# Patient Record
Sex: MALE | Race: WHITE | Employment: FULL TIME | URBAN - NONMETROPOLITAN AREA
[De-identification: names, ages, dates, MRNs, and addresses within clinical notes are randomized per-mention and may not be internally consistent; named-entity substitution may affect disease eponyms.]

---

## 2017-01-18 ENCOUNTER — OFFICE VISIT (OUTPATIENT)
Dept: PRIMARY CARE CLINIC | Age: 27
End: 2017-01-18
Payer: COMMERCIAL

## 2017-01-18 VITALS
HEART RATE: 120 BPM | WEIGHT: 220.5 LBS | HEIGHT: 68 IN | SYSTOLIC BLOOD PRESSURE: 130 MMHG | OXYGEN SATURATION: 96 % | DIASTOLIC BLOOD PRESSURE: 78 MMHG | BODY MASS INDEX: 33.42 KG/M2 | TEMPERATURE: 99 F

## 2017-01-18 DIAGNOSIS — J01.10 ACUTE FRONTAL SINUSITIS, RECURRENCE NOT SPECIFIED: Primary | ICD-10-CM

## 2017-01-18 DIAGNOSIS — M67.432 GANGLION CYST OF WRIST, LEFT: ICD-10-CM

## 2017-01-18 PROCEDURE — 99213 OFFICE O/P EST LOW 20 MIN: CPT | Performed by: NURSE PRACTITIONER

## 2017-01-18 RX ORDER — FLUTICASONE PROPIONATE 50 MCG
2 SPRAY, SUSPENSION (ML) NASAL DAILY
Qty: 1 BOTTLE | Refills: 0 | Status: SHIPPED | OUTPATIENT
Start: 2017-01-18 | End: 2019-06-07

## 2017-01-18 RX ORDER — CEFDINIR 300 MG/1
300 CAPSULE ORAL 2 TIMES DAILY
Qty: 20 CAPSULE | Refills: 0 | Status: SHIPPED | OUTPATIENT
Start: 2017-01-18 | End: 2017-01-28

## 2017-01-18 RX ORDER — CYCLOBENZAPRINE HCL 10 MG
1 TABLET ORAL NIGHTLY
COMMUNITY
Start: 2017-01-04 | End: 2019-06-07

## 2017-01-18 ASSESSMENT — ENCOUNTER SYMPTOMS
ABDOMINAL PAIN: 0
SINUS PRESSURE: 1
CHOKING: 0
COUGH: 1
WHEEZING: 0
VOMITING: 0
RHINORRHEA: 1
VOICE CHANGE: 0
TROUBLE SWALLOWING: 0
NAUSEA: 0
CONSTIPATION: 0
SHORTNESS OF BREATH: 0
DIARRHEA: 0
BACK PAIN: 0
SORE THROAT: 1
CHEST TIGHTNESS: 0
ABDOMINAL DISTENTION: 0

## 2017-04-21 ENCOUNTER — TELEPHONE (OUTPATIENT)
Dept: PRIMARY CARE CLINIC | Age: 27
End: 2017-04-21

## 2017-04-21 RX ORDER — CEFDINIR 300 MG/1
300 CAPSULE ORAL 2 TIMES DAILY
Qty: 20 CAPSULE | Refills: 0 | Status: SHIPPED | OUTPATIENT
Start: 2017-04-21 | End: 2017-05-01

## 2019-06-07 ENCOUNTER — OFFICE VISIT (OUTPATIENT)
Dept: PRIMARY CARE CLINIC | Age: 29
End: 2019-06-07
Payer: COMMERCIAL

## 2019-06-07 ENCOUNTER — TELEPHONE (OUTPATIENT)
Dept: PRIMARY CARE CLINIC | Age: 29
End: 2019-06-07

## 2019-06-07 VITALS
BODY MASS INDEX: 28.76 KG/M2 | HEIGHT: 67 IN | OXYGEN SATURATION: 93 % | DIASTOLIC BLOOD PRESSURE: 84 MMHG | SYSTOLIC BLOOD PRESSURE: 132 MMHG | WEIGHT: 183.25 LBS | TEMPERATURE: 99.8 F | HEART RATE: 108 BPM

## 2019-06-07 DIAGNOSIS — R50.9 LOW GRADE FEVER: ICD-10-CM

## 2019-06-07 DIAGNOSIS — T67.5XXA HEAT EXHAUSTION, INITIAL ENCOUNTER: ICD-10-CM

## 2019-06-07 DIAGNOSIS — M25.50 ARTHRALGIA, UNSPECIFIED JOINT: ICD-10-CM

## 2019-06-07 DIAGNOSIS — D64.9 ANEMIA, UNSPECIFIED TYPE: ICD-10-CM

## 2019-06-07 DIAGNOSIS — M25.50 ARTHRALGIA, UNSPECIFIED JOINT: Primary | ICD-10-CM

## 2019-06-07 DIAGNOSIS — R70.0 ELEVATED SED RATE: Primary | ICD-10-CM

## 2019-06-07 DIAGNOSIS — M79.10 MUSCLE ACHE: ICD-10-CM

## 2019-06-07 LAB
ALBUMIN SERPL-MCNC: 4.4 G/DL (ref 3.5–5.2)
ALP BLD-CCNC: 95 U/L (ref 40–130)
ALT SERPL-CCNC: 23 U/L (ref 5–41)
ANION GAP SERPL CALCULATED.3IONS-SCNC: 16 MMOL/L (ref 7–19)
AST SERPL-CCNC: 50 U/L (ref 5–40)
BASOPHILS ABSOLUTE: 0.1 K/UL (ref 0–0.2)
BASOPHILS RELATIVE PERCENT: 0.5 % (ref 0–1)
BILIRUB SERPL-MCNC: 0.9 MG/DL (ref 0.2–1.2)
BUN BLDV-MCNC: 10 MG/DL (ref 6–20)
C-REACTIVE PROTEIN: 10.11 MG/DL (ref 0–0.5)
CALCIUM SERPL-MCNC: 9.1 MG/DL (ref 8.6–10)
CHLORIDE BLD-SCNC: 97 MMOL/L (ref 98–111)
CO2: 24 MMOL/L (ref 22–29)
CREAT SERPL-MCNC: 1 MG/DL (ref 0.5–1.2)
EOSINOPHILS ABSOLUTE: 0 K/UL (ref 0–0.6)
EOSINOPHILS RELATIVE PERCENT: 0.2 % (ref 0–5)
GFR NON-AFRICAN AMERICAN: >60
GLUCOSE BLD-MCNC: 125 MG/DL (ref 74–109)
HCT VFR BLD CALC: 38.5 % (ref 42–52)
HEMOGLOBIN: 12.9 G/DL (ref 14–18)
LYMPHOCYTES ABSOLUTE: 0.5 K/UL (ref 1.1–4.5)
LYMPHOCYTES RELATIVE PERCENT: 4.8 % (ref 20–40)
MCH RBC QN AUTO: 30.9 PG (ref 27–31)
MCHC RBC AUTO-ENTMCNC: 33.5 G/DL (ref 33–37)
MCV RBC AUTO: 92.3 FL (ref 80–94)
MONOCYTES ABSOLUTE: 0.5 K/UL (ref 0–0.9)
MONOCYTES RELATIVE PERCENT: 4.6 % (ref 0–10)
NEUTROPHILS ABSOLUTE: 9.7 K/UL (ref 1.5–7.5)
NEUTROPHILS RELATIVE PERCENT: 89.4 % (ref 50–65)
PDW BLD-RTO: 11.9 % (ref 11.5–14.5)
PLATELET # BLD: 228 K/UL (ref 130–400)
PMV BLD AUTO: 11.2 FL (ref 9.4–12.4)
POTASSIUM SERPL-SCNC: 4.1 MMOL/L (ref 3.5–5)
RBC # BLD: 4.17 M/UL (ref 4.7–6.1)
SEDIMENTATION RATE, ERYTHROCYTE: 39 MM/HR (ref 0–10)
SODIUM BLD-SCNC: 137 MMOL/L (ref 136–145)
TOTAL CK: 1044 U/L (ref 39–308)
TOTAL PROTEIN: 7.5 G/DL (ref 6.6–8.7)
URIC ACID, SERUM: 4.3 MG/DL (ref 3.4–7)
WBC # BLD: 10.8 K/UL (ref 4.8–10.8)

## 2019-06-07 PROCEDURE — 1036F TOBACCO NON-USER: CPT | Performed by: NURSE PRACTITIONER

## 2019-06-07 PROCEDURE — G8419 CALC BMI OUT NRM PARAM NOF/U: HCPCS | Performed by: NURSE PRACTITIONER

## 2019-06-07 PROCEDURE — G8427 DOCREV CUR MEDS BY ELIG CLIN: HCPCS | Performed by: NURSE PRACTITIONER

## 2019-06-07 PROCEDURE — 99213 OFFICE O/P EST LOW 20 MIN: CPT | Performed by: NURSE PRACTITIONER

## 2019-06-07 RX ORDER — DOXYCYCLINE HYCLATE 100 MG
100 TABLET ORAL 2 TIMES DAILY
Qty: 28 TABLET | Refills: 0 | Status: SHIPPED | OUTPATIENT
Start: 2019-06-07 | End: 2019-06-21

## 2019-06-07 RX ORDER — METHYLPREDNISOLONE 4 MG/1
TABLET ORAL
Qty: 1 KIT | Refills: 0 | Status: SHIPPED | OUTPATIENT
Start: 2019-06-07 | End: 2019-06-13

## 2019-06-07 ASSESSMENT — ENCOUNTER SYMPTOMS
SORE THROAT: 0
BACK PAIN: 0
EYES NEGATIVE: 1
ABDOMINAL PAIN: 0
DIARRHEA: 0
NAUSEA: 0
SINUS PAIN: 0
CONSTIPATION: 0
SHORTNESS OF BREATH: 0
TROUBLE SWALLOWING: 0
VOMITING: 0
WHEEZING: 0

## 2019-06-07 ASSESSMENT — PATIENT HEALTH QUESTIONNAIRE - PHQ9
SUM OF ALL RESPONSES TO PHQ9 QUESTIONS 1 & 2: 0
2. FEELING DOWN, DEPRESSED OR HOPELESS: 0
SUM OF ALL RESPONSES TO PHQ QUESTIONS 1-9: 0
1. LITTLE INTEREST OR PLEASURE IN DOING THINGS: 0
SUM OF ALL RESPONSES TO PHQ QUESTIONS 1-9: 0

## 2019-06-07 NOTE — TELEPHONE ENCOUNTER
----- Message from ELLIE Mason sent at 6/7/2019  4:07 PM CDT -----  Uric acid normal  cmp wnl  CK noted muscle wasting  Must monitor for any tea urine if so to ER asap   This is from muscle wasting : so increase fluids is very important  Sed rate elevated  Make sure to return Monday to recheck sed rate  crp and ck

## 2019-06-07 NOTE — PATIENT INSTRUCTIONS
Patient Education        Learning About Fever  What is a fever? A fever is a high body temperature. It's one way your body fights being sick. A fever shows that the body is responding to infection or other illnesses, both minor and severe. A fever is a symptom, not an illness by itself. A fever can be a sign that you are ill, but most fevers are not caused by a serious problem. You may have a fever with a minor illness, such as a cold. But sometimes a very serious infection may cause little or no fever. It is important to look at other symptoms, other conditions you have, and how you feel in general. In children, notice how they act and see what symptoms they complain of. What is a normal body temperature? A normal body temperature is about 98. 6ºF. Some people have a normal temperature that is a little higher or a little lower than this. Your temperature may be a little lower in the morning than it is later in the day. It may go up during hot weather or when you exercise, wear heavy clothes, or take a hot bath. Your temperature may also be different depending on how you take it. A temperature taken in the mouth (oral) or under the arm may be a little lower than your core temperature (rectal). What is a fever temperature? A core temperature of 100.4°F or above is considered a fever. What can cause a fever? A fever may be caused by:  · Infections. This is the most common cause of a fever. Examples of infections that can cause a fever include the flu, a kidney infection, or pneumonia. · Some medicines. · Severe trauma or injury, such as a heart attack, stroke, heatstroke, or burns. · Other medical conditions, such as arthritis and some cancers. How can you treat a fever at home? · Ask your doctor if you can take an over-the-counter pain medicine, such as acetaminophen (Tylenol), ibuprofen (Advil, Motrin), or naproxen (Aleve). Be safe with medicines.  Read and follow all instructions on the label.  · To prevent dehydration, drink plenty of fluids. Choose water and other caffeine-free clear liquids until you feel better. If you have kidney, heart, or liver disease and have to limit fluids, talk with your doctor before you increase the amount of fluids you drink. Follow-up care is a key part of your treatment and safety. Be sure to make and go to all appointments, and call your doctor if you are having problems. It's also a good idea to know your test results and keep a list of the medicines you take. Where can you learn more? Go to https://Haute ApppeInquirly.Rawbots. org and sign in to your [employer] account. Enter S550 in the TipRanks box to learn more about \"Learning About Fever. \"     If you do not have an account, please click on the \"Sign Up Now\" link. Current as of: September 23, 2018  Content Version: 12.0  © 3120-7766 Healthwise, Incorporated. Care instructions adapted under license by [facility] ([facility]). If you have questions about a medical condition or this instruction, always ask your healthcare professional. [place] [address] any warranty or liability for your use of this information.

## 2019-06-07 NOTE — TELEPHONE ENCOUNTER
Pt is aware. He said that he would come to get the FIT test he comes for labs on Monday. Labs ordered.

## 2019-06-07 NOTE — TELEPHONE ENCOUNTER
----- Message from ELLIE Reaves sent at 6/7/2019  3:13 PM CDT -----  CBC noted very mild anemia otherwise no concerns  No signs of infection  Suggest a fit test just to make shows no bleeding  Recheck CBC in 6 weeks

## 2019-06-07 NOTE — PROGRESS NOTES
Free   Result Value Ref Range    T4 Free 1.5 0.9 - 1.7 ng/ml   TSH without Reflex   Result Value Ref Range    TSH 3.44 0.27 - 4.20 uIU/mL   Lipid Panel   Result Value Ref Range    Cholesterol, Total 243 (H) 160 - 199 mg/dL    Triglycerides 619 (H) 150 - 199 mg/dL    HDL 30 (L) 55 - 121 mg/dL    LDL Calculated - (AA) <100 mg/dL   Comprehensive Metabolic Panel   Result Value Ref Range    Sodium 140 136 - 145 mmol/L    Potassium 4.0 3.5 - 5.0 mmol/L    Chloride 98 98 - 111 mmol/L    CO2 24 22 - 29 mmol/L    Anion Gap 18 7 - 19 mmol/L    Glucose 102 74 - 109 mg/dL    BUN 13 6 - 20 mg/dL    CREATININE 0.9 0.5 - 1.2 mg/dL    GFR Non-African American >60 >60    Calcium 9.7 8.6 - 10.0 mg/dL    Total Protein 7.5 6.6 - 8.7 g/dL    Alb 4.5 3.5 - 5.2 g/dL    Total Bilirubin 0.3 0.2 - 1.2 mg/dL    Alkaline Phosphatase 133 (H) 40 - 130 U/L    ALT 26 5 - 41 U/L    AST 23 5 - 40 U/L    Globulin 3.0 g/dL   CBC Auto Differential   Result Value Ref Range    WBC 8.3 4.8 - 10.8 K/uL    RBC 4.75 4.70 - 6.10 M/uL    Hemoglobin 14.6 14.0 - 18.0 g/dL    Hematocrit 41.9 (L) 42.0 - 52.0 %    MCV 88.2 80.0 - 94.0 fL    MCH 30.7 27.0 - 31.0 pg    MCHC 34.8 33.0 - 37.0 g/dL    RDW 13.0 11.5 - 14.5 %    Platelets 332 118 - 711 K/uL    MPV 11.5 (H) 7.4 - 10.4 fL    Neutrophils % 59.8 50.0 - 65.0 %    Lymphocytes % 23.4 20.0 - 40.0 %    Monocytes % 11.2 (H) 0.0 - 10.0 %    Eosinophils % 5.0 0.0 - 5.0 %    Basophils % 0.6 0.0 - 1.0 %    Neutrophils # 5.0 1.5 - 7.5 K/uL    Lymphocytes # 2.0 1.1 - 4.5 K/uL    Monocytes # 0.90 0.00 - 0.90 K/uL    Eosinophils # 0.40 0.00 - 0.60 K/uL    Basophils # 0.10 0.00 - 0.20 K/uL   C-Reactive Protein   Result Value Ref Range    CRP <0.3 0.0 - 5.0 mg/L   Vitamin D 25 Hydroxy   Result Value Ref Range    Vit D, 25-Hydroxy 20.6 (L) >=30 ng/mL   Folate   Result Value Ref Range    Folate 6.9 4.5 - 32.2 ng/mL   Vitamin B12   Result Value Ref Range    Vitamin B-12 584 211 - 946 pg/mL   T3, Free   Result Value Ref Range T3, Free 3.4 2.0 - 4.4 pg/mL   Urinalysis   Result Value Ref Range    Color, UA DK YELLOW Straw/Yellow    Clarity, UA Clear Clear    Glucose, Ur Negative Negative mg/dL    Bilirubin Urine Negative Negative    Ketones, Urine Negative Negative mg/dL    Specific Gravity, UA 1.026 1.005 - 1.030    Blood, Urine Negative Negative    pH, UA 5.5 5.0 - 8.0    Protein, UA Negative Negative mg/dL    Urobilinogen, Urine 0.2 <2.0 E.U./dL    Nitrite, Urine Negative Negative    Leukocyte Esterase, Urine Negative Negative   Microalbumin / Creatinine Urine Ratio   Result Value Ref Range    Microalbumin, Random Urine 2.10 0.00 - 19.00 mg/dL    Creatinine, Ur 329.7 4.2 - 622.0 mg/dL    Microalbumin Creatinine Ratio 6.4 mg/g   LDL Cholesterol, Direct   Result Value Ref Range    LDL Direct 128 <100 mg/dL       I have reviewed the following with the Mr. Giovanni Aase   Lab Review   No visits with results within 6 Month(s) from this visit.    Latest known visit with results is:   Orders Only on 11/04/2016   Component Date Value    T4 Free 11/04/2016 1.5     TSH 11/04/2016 3.44     Cholesterol, Total 11/04/2016 243*    Triglycerides 11/04/2016 619*    HDL 11/04/2016 30*    LDL Calculated 11/04/2016 -*    Sodium 11/04/2016 140     Potassium 11/04/2016 4.0     Chloride 11/04/2016 98     CO2 11/04/2016 24     Anion Gap 11/04/2016 18     Glucose 11/04/2016 102     BUN 11/04/2016 13     CREATININE 11/04/2016 0.9     GFR Non- 11/04/2016 >60     Calcium 11/04/2016 9.7     Total Protein 11/04/2016 7.5     Alb 11/04/2016 4.5     Total Bilirubin 11/04/2016 0.3     Alkaline Phosphatase 11/04/2016 133*    ALT 11/04/2016 26     AST 11/04/2016 23     Globulin 11/04/2016 3.0     WBC 11/04/2016 8.3     RBC 11/04/2016 4.75     Hemoglobin 11/04/2016 14.6     Hematocrit 11/04/2016 41.9*    MCV 11/04/2016 88.2     MCH 11/04/2016 30.7     MCHC 11/04/2016 34.8     RDW 11/04/2016 13.0     Platelets 37/72/3764 288     MPV 11/04/2016 11.5*    Neutrophils % 11/04/2016 59.8     Lymphocytes % 11/04/2016 23.4     Monocytes % 11/04/2016 11.2*    Eosinophils % 11/04/2016 5.0     Basophils % 11/04/2016 0.6     Neutrophils # 11/04/2016 5.0     Lymphocytes # 11/04/2016 2.0     Monocytes # 11/04/2016 0.90     Eosinophils # 11/04/2016 0.40     Basophils # 11/04/2016 0.10     CRP 11/04/2016 <0.3     Vit D, 25-Hydroxy 11/04/2016 20.6*    Folate 11/04/2016 6.9     Vitamin B-12 11/04/2016 584     T3, Free 11/04/2016 3.4     Color, UA 11/04/2016 DK YELLOW     Clarity, UA 11/04/2016 Clear     Glucose, Ur 11/04/2016 Negative     Bilirubin Urine 11/04/2016 Negative     Ketones, Urine 11/04/2016 Negative     Specific Gravity, UA 11/04/2016 1.026     Blood, Urine 11/04/2016 Negative     pH, UA 11/04/2016 5.5     Protein, UA 11/04/2016 Negative     Urobilinogen, Urine 11/04/2016 0.2     Nitrite, Urine 11/04/2016 Negative     Leukocyte Esterase, Urine 11/04/2016 Negative     Microalbumin, Random Uri* 11/04/2016 2.10     Creatinine, Ur 11/04/2016 329.7     Microalbumin Creatinine * 11/04/2016 6.4     LDL Direct 11/04/2016 128      Copies of these are in the chart. Prior to Visit Medications    Medication Sig Taking? Authorizing Provider   methylPREDNISolone (MEDROL DOSEPACK) 4 MG tablet Take by mouth.  Yes LevoELLIE Agrawal   doxycycline hyclate (VIBRA-TABS) 100 MG tablet Take 1 tablet by mouth 2 times daily for 14 days Yes LevoELLIE Agrawal   ZUBSOLV 5.7-1.4 MG SUBL 3 times daily Takes 3.5 daily Yes Historical Provider, MD       Allergies: Codeine and Pcn [penicillins]    Past Medical History:   Diagnosis Date    Anxiety     Chronic back pain     hurt back at work and l5 buldging disc    Headache     Hypertension     Substance abuse [facility])        Past Surgical History:   Procedure Laterality Date    TONSILLECTOMY         Social History     Tobacco Use    Smoking status: Never Smoker    Smokeless tobacco: Never Used   Substance Use Topics    Alcohol use: No       Review of Systems   Constitutional: Positive for activity change, appetite change, chills, fatigue and fever. HENT: Positive for congestion. Negative for postnasal drip, sinus pain, sneezing, sore throat and trouble swallowing. Eyes: Negative. Respiratory: Negative for shortness of breath and wheezing. Cardiovascular: Negative for chest pain, palpitations and leg swelling. Gastrointestinal: Negative for abdominal pain, constipation, diarrhea, nausea and vomiting. Genitourinary: Negative for difficulty urinating and hematuria. Musculoskeletal: Positive for arthralgias. Negative for back pain. Skin: Negative for rash. Neurological: Negative for dizziness, tremors, seizures, speech difficulty and headaches. Hematological: Negative for adenopathy. Psychiatric/Behavioral: Negative for behavioral problems and sleep disturbance. The patient is not nervous/anxious and is not hyperactive. Physical Exam   Constitutional: He is oriented to person, place, and time. He appears well-developed and well-nourished. No distress. HENT:   Head: Normocephalic. Right Ear: External ear normal.   Left Ear: External ear normal.   Mouth/Throat: No oropharyngeal exudate. Eyes: Right eye exhibits no discharge. Left eye exhibits no discharge. Neck: Normal range of motion. Cardiovascular: Normal rate, regular rhythm, normal heart sounds and intact distal pulses. No murmur heard. Pulmonary/Chest: Effort normal and breath sounds normal. No respiratory distress. He has no wheezes. Abdominal: Soft. Bowel sounds are normal.   Musculoskeletal:   Denies red joints or swelling  Swollen left big toe off and on   Lymphadenopathy:     He has no cervical adenopathy. Neurological: He is alert and oriented to person, place, and time. Skin: Skin is warm. Capillary refill takes less than 2 seconds. No rash noted. He is not diaphoretic.    Psychiatric: He has a normal mood and affect. His behavior is normal.   Vitals reviewed. ASSESSMENT/ PLAN    1. Arthralgia, unspecified joint  Discussed steroids  Joint pain   - CBC Auto Differential; Future  - Sedimentation Rate; Future  - Uric Acid; Future  - C-Reactive Protein; Future  - Comprehensive Metabolic Panel; Future  - methylPREDNISolone (MEDROL DOSEPACK) 4 MG tablet; Take by mouth. Dispense: 1 kit; Refill: 0  - doxycycline hyclate (VIBRA-TABS) 100 MG tablet; Take 1 tablet by mouth 2 times daily for 14 days  Dispense: 28 tablet; Refill: 0    2. Muscle ache  Will increase fluids  - CBC Auto Differential; Future  - Sedimentation Rate; Future  - Uric Acid; Future  - C-Reactive Protein; Future  - Comprehensive Metabolic Panel; Future  - methylPREDNISolone (MEDROL DOSEPACK) 4 MG tablet; Take by mouth. Dispense: 1 kit; Refill: 0  - doxycycline hyclate (VIBRA-TABS) 100 MG tablet; Take 1 tablet by mouth 2 times daily for 14 days  Dispense: 28 tablet; Refill: 0    3. Heat exhaustion, initial encounter  suportive care  - CK; Future  - methylPREDNISolone (MEDROL DOSEPACK) 4 MG tablet; Take by mouth. Dispense: 1 kit; Refill: 0  - doxycycline hyclate (VIBRA-TABS) 100 MG tablet; Take 1 tablet by mouth 2 times daily for 14 days  Dispense: 28 tablet; Refill: 0    4. Low grade fever  Will treat tick disease  As no labs back    - methylPREDNISolone (MEDROL DOSEPACK) 4 MG tablet; Take by mouth. Dispense: 1 kit; Refill: 0  - doxycycline hyclate (VIBRA-TABS) 100 MG tablet; Take 1 tablet by mouth 2 times daily for 14 days  Dispense: 28 tablet; Refill: 0      Orders Placed This Encounter   Procedures    CBC Auto Differential    Sedimentation Rate    Uric Acid    C-Reactive Protein    Comprehensive Metabolic Panel    CK        Return if symptoms worsen or fail to improve. Patient Instructions     Patient Education        Learning About Fever  What is a fever? A fever is a high body temperature.  It's one way your body fights being sick. A fever shows that the body is responding to infection or other illnesses, both minor and severe. A fever is a symptom, not an illness by itself. A fever can be a sign that you are ill, but most fevers are not caused by a serious problem. You may have a fever with a minor illness, such as a cold. But sometimes a very serious infection may cause little or no fever. It is important to look at other symptoms, other conditions you have, and how you feel in general. In children, notice how they act and see what symptoms they complain of. What is a normal body temperature? A normal body temperature is about 98. 6ºF. Some people have a normal temperature that is a little higher or a little lower than this. Your temperature may be a little lower in the morning than it is later in the day. It may go up during hot weather or when you exercise, wear heavy clothes, or take a hot bath. Your temperature may also be different depending on how you take it. A temperature taken in the mouth (oral) or under the arm may be a little lower than your core temperature (rectal). What is a fever temperature? A core temperature of 100.4°F or above is considered a fever. What can cause a fever? A fever may be caused by:  · Infections. This is the most common cause of a fever. Examples of infections that can cause a fever include the flu, a kidney infection, or pneumonia. · Some medicines. · Severe trauma or injury, such as a heart attack, stroke, heatstroke, or burns. · Other medical conditions, such as arthritis and some cancers. How can you treat a fever at home? · Ask your doctor if you can take an over-the-counter pain medicine, such as acetaminophen (Tylenol), ibuprofen (Advil, Motrin), or naproxen (Aleve). Be safe with medicines. Read and follow all instructions on the label. · To prevent dehydration, drink plenty of fluids. Choose water and other caffeine-free clear liquids until you feel better.  If you have kidney, heart, or liver disease and have to limit fluids, talk with your doctor before you increase the amount of fluids you drink. Follow-up care is a key part of your treatment and safety. Be sure to make and go to all appointments, and call your doctor if you are having problems. It's also a good idea to know your test results and keep a list of the medicines you take. Where can you learn more? Go to https://Ahorro Libre.Tripbirds. org and sign in to your [employer] account. Enter G668 in the [employer] box to learn more about \"Learning About Fever. \"     If you do not have an account, please click on the \"Sign Up Now\" link. Current as of: September 23, 2018  Content Version: 12.0  © 2133-1325 Healthwise, [employer]. Care instructions adapted under license by [facility] ([facility]). If you have questions about a medical condition or this instruction, always ask your healthcare professional. [place] [address] any warranty or liability for your use of this information. Controlled Substances Monitoring: Additional Instructions: As always, patient is advisedto bring in medication bottles in order to correctly reconcile with our current list.      Yuriy Burgess received counseling on the following healthy behaviors: none    Patient giveneducational materials on plan of care    I have instructed Yuriy Burgess to complete a self tracking handout on none and instructed them to bring it with them to his next appointment. Discussed use, benefit, and side effects of prescribed medications. Barriers to medication compliance addressed. All patient questions answered. Pt voiced understanding.      ELLIE Collins

## 2019-06-10 ENCOUNTER — TELEPHONE (OUTPATIENT)
Dept: PRIMARY CARE CLINIC | Age: 29
End: 2019-06-10

## 2019-06-10 DIAGNOSIS — D64.9 ANEMIA, UNSPECIFIED TYPE: ICD-10-CM

## 2019-06-10 DIAGNOSIS — R70.0 ELEVATED SED RATE: ICD-10-CM

## 2019-06-10 LAB
BASOPHILS ABSOLUTE: 0.1 K/UL (ref 0–0.2)
BASOPHILS RELATIVE PERCENT: 0.5 % (ref 0–1)
C-REACTIVE PROTEIN: 4.15 MG/DL (ref 0–0.5)
EOSINOPHILS ABSOLUTE: 0.2 K/UL (ref 0–0.6)
EOSINOPHILS RELATIVE PERCENT: 1.4 % (ref 0–5)
HCT VFR BLD CALC: 36.4 % (ref 42–52)
HEMOGLOBIN: 11.9 G/DL (ref 14–18)
LYMPHOCYTES ABSOLUTE: 1.3 K/UL (ref 1.1–4.5)
LYMPHOCYTES RELATIVE PERCENT: 11 % (ref 20–40)
MCH RBC QN AUTO: 30.1 PG (ref 27–31)
MCHC RBC AUTO-ENTMCNC: 32.7 G/DL (ref 33–37)
MCV RBC AUTO: 91.9 FL (ref 80–94)
MONOCYTES ABSOLUTE: 0.6 K/UL (ref 0–0.9)
MONOCYTES RELATIVE PERCENT: 5.5 % (ref 0–10)
NEUTROPHILS ABSOLUTE: 9.3 K/UL (ref 1.5–7.5)
NEUTROPHILS RELATIVE PERCENT: 80.6 % (ref 50–65)
PDW BLD-RTO: 12 % (ref 11.5–14.5)
PLATELET # BLD: 329 K/UL (ref 130–400)
PMV BLD AUTO: 10.9 FL (ref 9.4–12.4)
RBC # BLD: 3.96 M/UL (ref 4.7–6.1)
TOTAL CK: 181 U/L (ref 39–308)
WBC # BLD: 11.5 K/UL (ref 4.8–10.8)

## 2019-06-10 NOTE — TELEPHONE ENCOUNTER
----- Message from ELLIE Garibay sent at 6/10/2019  6:37 PM CDT -----  CRP improving   Will recheck at follow up   Sooner if pain returns

## 2019-06-10 NOTE — TELEPHONE ENCOUNTER
----- Message from ELLIE Schuler sent at 6/10/2019  6:06 PM CDT -----  CBC noted anemia worse   Check fit test any s/s of bleeding in stool  This could be correct level since you were most likely dehydrated  Make follow up for 2 weeks and do cbc before apt   And make sure does fit test

## 2019-06-10 NOTE — LETTER
[address] [place] [address]  Phone: [phone]  Fax: [phone]    ELLIE Lee        June 13, 2019    [address]  [address]      Dear Michael Gloria:    We have been unable to reach you by phone with your lab results     If you have any questions or concerns, please don't hesitate to call.     Sincerely,        ELLIE Lee

## 2019-06-13 NOTE — TELEPHONE ENCOUNTER
I have attempted without success to contact this patient by phone to discuss lab results and I left a message on answering machine.       Letter sent

## 2020-02-24 ENCOUNTER — APPOINTMENT (OUTPATIENT)
Dept: CT IMAGING | Facility: HOSPITAL | Age: 30
End: 2020-02-24

## 2020-02-24 ENCOUNTER — HOSPITAL ENCOUNTER (EMERGENCY)
Facility: HOSPITAL | Age: 30
Discharge: HOME | End: 2020-02-24
Attending: EMERGENCY MEDICINE | Admitting: EMERGENCY MEDICINE

## 2020-02-24 VITALS
HEIGHT: 68 IN | WEIGHT: 195 LBS | DIASTOLIC BLOOD PRESSURE: 81 MMHG | OXYGEN SATURATION: 95 % | RESPIRATION RATE: 14 BRPM | BODY MASS INDEX: 29.55 KG/M2 | SYSTOLIC BLOOD PRESSURE: 124 MMHG | HEART RATE: 67 BPM | TEMPERATURE: 98.3 F

## 2020-02-24 DIAGNOSIS — M54.2 NECK PAIN: ICD-10-CM

## 2020-02-24 DIAGNOSIS — M54.50 ACUTE MIDLINE LOW BACK PAIN WITHOUT SCIATICA: ICD-10-CM
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
## 2020-02-24 PROCEDURE — 72125 CT NECK SPINE W/O DYE: CPT

## 2020-02-24 PROCEDURE — 72131 CT LUMBAR SPINE W/O DYE: CPT

## 2020-02-24 PROCEDURE — 99284 EMERGENCY DEPT VISIT MOD MDM: CPT

## 2020-02-24 RX ORDER — BUPRENORPHINE AND NALOXONE 8; 2 MG/1; MG/1
FILM, SOLUBLE BUCCAL; SUBLINGUAL 3 TIMES DAILY
COMMUNITY

## 2020-02-24 RX ORDER — KETOROLAC TROMETHAMINE 10 MG/1
10 TABLET, FILM COATED ORAL ONCE
Status: COMPLETED | OUTPATIENT
Start: 2020-02-24 | End: 2020-02-24

## 2020-02-24 RX ORDER — CLONAZEPAM 0.5 MG/1
0.5 TABLET ORAL ONCE
Status: COMPLETED | OUTPATIENT
Start: 2020-02-24 | End: 2020-02-24

## 2020-02-24 RX ORDER — ALPRAZOLAM 1 MG/1
1 TABLET ORAL 3 TIMES DAILY PRN
COMMUNITY

## 2020-02-24 RX ADMIN — CLONAZEPAM 0.5 MG: 0.5 TABLET ORAL at 08:37

## 2020-02-24 RX ADMIN — KETOROLAC TROMETHAMINE 10 MG: 10 TABLET, FILM COATED ORAL at 08:37
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
## 2021-04-14 ENCOUNTER — HOSPITAL ENCOUNTER (EMERGENCY)
Age: 31
Discharge: HOME | End: 2021-04-14
Attending: EMERGENCY MEDICINE
Payer: COMMERCIAL

## 2021-04-14 ENCOUNTER — OFFICE VISIT (OUTPATIENT)
Dept: URGENT CARE | Age: 31
End: 2021-04-14

## 2021-04-14 ENCOUNTER — APPOINTMENT (OUTPATIENT)
Dept: GENERAL RADIOLOGY | Age: 31
End: 2021-04-14
Payer: COMMERCIAL

## 2021-04-14 VITALS
HEART RATE: 84 BPM | DIASTOLIC BLOOD PRESSURE: 92 MMHG | HEIGHT: 68 IN | BODY MASS INDEX: 31.07 KG/M2 | WEIGHT: 205 LBS | SYSTOLIC BLOOD PRESSURE: 157 MMHG | TEMPERATURE: 98.2 F | RESPIRATION RATE: 20 BRPM | OXYGEN SATURATION: 97 %

## 2021-04-14 DIAGNOSIS — S49.91XA INJURY OF RIGHT SHOULDER, INITIAL ENCOUNTER: Primary | ICD-10-CM

## 2021-04-14 DIAGNOSIS — S43.014A ANTERIOR SHOULDER DISLOCATION, RIGHT, INITIAL ENCOUNTER: Primary | ICD-10-CM

## 2021-04-14 PROCEDURE — 96372 THER/PROPH/DIAG INJ SC/IM: CPT

## 2021-04-14 PROCEDURE — 73030 X-RAY EXAM OF SHOULDER: CPT

## 2021-04-14 PROCEDURE — 6360000002 HC RX W HCPCS: Performed by: EMERGENCY MEDICINE

## 2021-04-14 PROCEDURE — 23650 CLTX SHO DSLC W/MNPJ WO ANES: CPT

## 2021-04-14 PROCEDURE — 99283 EMERGENCY DEPT VISIT LOW MDM: CPT

## 2021-04-14 RX ORDER — KETOROLAC TROMETHAMINE 30 MG/ML
30 INJECTION, SOLUTION INTRAMUSCULAR; INTRAVENOUS ONCE
Status: COMPLETED | OUTPATIENT
Start: 2021-04-14 | End: 2021-04-14

## 2021-04-14 RX ADMIN — KETOROLAC TROMETHAMINE 30 MG: 30 INJECTION, SOLUTION INTRAMUSCULAR; INTRAVENOUS at 09:17

## 2021-04-14 ASSESSMENT — ENCOUNTER SYMPTOMS
COUGH: 0
ABDOMINAL PAIN: 0
BACK PAIN: 0
SHORTNESS OF BREATH: 0

## 2021-04-14 ASSESSMENT — PAIN SCALES - GENERAL
PAINLEVEL_OUTOF10: 4
PAINLEVEL_OUTOF10: 8
PAINLEVEL_OUTOF10: 8

## 2021-04-14 ASSESSMENT — PAIN DESCRIPTION - ORIENTATION: ORIENTATION: RIGHT

## 2021-04-14 ASSESSMENT — PAIN DESCRIPTION - LOCATION: LOCATION: SHOULDER

## 2021-04-14 NOTE — ED NOTES
Bed: 01-A  Expected date:   Expected time:   Means of arrival:   Comments:     Erika Mayorga RN  04/14/21 6255

## 2021-04-14 NOTE — ED PROVIDER NOTES
[facility] EMERGENCY DEPT  eMERGENCY dEPARTMENT eNCOUnter      Pt Name: Paulino Mcmanus  MRN: [id number]  [place] 1990  Date of evaluation: 4/14/2021  Provider: Jessie Clemente MD    CHIEF COMPLAINT       Chief Complaint   Patient presents with    Shoulder Injury         HISTORY OF PRESENT ILLNESS   (Location/Symptom, Timing/Onset,Context/Setting, Quality, Duration, Modifying Factors, Severity)  Note limiting factors. Paulino Mcmanus is a 33 y.o. male who presents to the emergency department with shoulder injury to his right shoulder. He states he was jumping down off of some machinery. He was at work. He caught his arm. He states it dislocated and he heard a pop. The patient presents in extreme pain crying. He states that he takes Suboxone and benzos at home for anxiety and prior old history of narcotic abuse. The patient does not want pain medication. The patient states that it hurts he points to his lateral right shoulder. There seems to be a defect where it may be subluxed or dislocated. The patient does not want pain medication I did initial movement with external rotation. There was a slight pop and it seemed to relocate. The patient tells me he is dislocated this arm 1 time before. He has no other injuries. He denies any tingling paresthesias weakness or decreased and warmth in his hand on the right. The history is provided by the patient. NursingNotes were reviewed. REVIEW OF SYSTEMS    (2-9 systems for level 4, 10 or more for level 5)     Review of Systems   Constitutional: Negative for fever. Respiratory: Negative for cough and shortness of breath. Gastrointestinal: Negative for abdominal pain. Musculoskeletal: Positive for joint swelling. Negative for back pain, neck pain and neck stiffness. Skin: Negative for rash and wound. Neurological: Negative for seizures and syncope. Psychiatric/Behavioral: Negative for confusion. The patient is nervous/anxious.         A complete review of systems was performed and is negative except as noted above in the HPI. PAST MEDICAL HISTORY     Past Medical History:   Diagnosis Date    Anxiety     Chronic back pain     hurt back at work and l5 buldging disc    Headache     Hypertension     Substance abuse ([address]          SURGICAL HISTORY       Past Surgical History:   Procedure Laterality Date    TONSILLECTOMY           CURRENT MEDICATIONS       Previous Medications    ZUBSOLV 5.7-1.4 MG SUBL    3 times daily Takes 3.5 daily       ALLERGIES     Codeine and Pcn [penicillins]    FAMILY HISTORY     History reviewed. No pertinent family history.        SOCIAL HISTORY       Social History     Socioeconomic History    Marital status:      Spouse name: None    Number of children: None    Years of education: None    Highest education level: None   Occupational History    None   Social Needs    Financial resource strain: None    Food insecurity     Worry: None     Inability: None    Transportation needs     Medical: None     Non-medical: None   Tobacco Use    Smoking status: Never Smoker    Smokeless tobacco: Never Used   Substance and Sexual Activity    Alcohol use: No    Drug use: None    Sexual activity: None   Lifestyle    Physical activity     Days per week: None     Minutes per session: None    Stress: None   Relationships    Social connections     Talks on phone: None     Gets together: None     Attends [employer] service: None     Active member of club or organization: None     Attends meetings of clubs or organizations: None     Relationship status: None    Intimate partner violence     Fear of current or ex partner: None     Emotionally abused: None     Physically abused: None     Forced sexual activity: None   Other Topics Concern    None   Social History Narrative    None       SCREENINGS             PHYSICAL EXAM    (up to 7 for level 4, 8 or more for level 5)     ED Triage Vitals   BP Temp Temp src Pulse Resp SpO2 Height Weight   -- -- -- -- -- -- -- --       Physical Exam  Vitals signs and nursing note reviewed. Constitutional:       General: He is in acute distress. Comments: Patient in severe pain with right shoulder deformity and likely dislocation or subluxation   HENT:      Head: Normocephalic and atraumatic. Eyes:      Extraocular Movements: Extraocular movements intact. Pupils: Pupils are equal, round, and reactive to light. Neck:      Musculoskeletal: Normal range of motion and neck supple. Cardiovascular:      Rate and Rhythm: Normal rate and regular rhythm. Pulmonary:      Effort: Pulmonary effort is normal. No respiratory distress. Breath sounds: No wheezing. Abdominal:      General: Abdomen is flat. There is no distension. Palpations: Abdomen is soft. Musculoskeletal:         General: Swelling, tenderness and deformity present. Comments: The right shoulder appears to have a subluxation or dislocation with a defect in the lateral right shoulder where the humeral head should be. The patient otherwise is neurovascular intact in his hand and his fingers. He is extremely anxious and crying and in severe pain. There is no obvious deformity of the clavicle. He has no neck pain. He has no distal pain in his humerus or in his forearm. Skin:     General: Skin is warm and dry. Neurological:      Mental Status: He is alert. Psychiatric:         Thought Content:  Thought content normal.         Judgment: Judgment normal.      Comments: Very anxious         DIAGNOSTIC RESULTS     EKG: All EKG's are interpreted by the Emergency Department Physician who either signs or Co-signs this chart in the absence of a cardiologist.        RADIOLOGY:   Non-plain film images such as CT, Ultrasound and MRI are read by the radiologist. Plainradiographic images are visualized and preliminarily interpreted by the emergency physician with the below findings:        Interpretation per the Radiologist below, if available at the time of this note:    XR SHOULDER RIGHT (MIN 2 VIEWS)   Final Result   1. No acute osseous pathology of the right shoulder. Signed by Dr Manju Valladares on 4/14/2021 8:33 AM            ED BEDSIDE ULTRASOUND:   Performed by ED Physician - none    LABS:  Labs Reviewed - No data to display    All other labs were within normal range or not returned as of this dictation. EMERGENCY DEPARTMENT COURSE and DIFFERENTIALDIAGNOSIS/MDM:   Vitals:    Vitals:    04/14/21 0818   BP: (!) 157/92   Pulse: 84   Resp: 20   Temp: 98.2 °F (36.8 °C)   SpO2: 97%   Weight: 205 lb (93 kg)   Height: 5' 8\" (1.727 m)       MDM  Number of Diagnoses or Management Options  Anterior shoulder dislocation, right, initial encounter  Diagnosis management comments: Patient with clinical anterior shoulder dislocation and severe pain. I did some external maneuvers as he did not want any pain medication due to him being on Suboxone. There was a pop. Please see procedure note. This relocated. X-ray confirms no fracture. The shoulder is in good position. I rerange the shoulder afterwards and he has full range of motion. He may have a rotator cuff injury. He needs to have a reexam next week with orthopedics placed in sling and swath for a couple days. Then range of motion exercises discussed anti-inflammatories. Stable for follow-up and discharge. Amount and/or Complexity of Data Reviewed  Tests in the radiology section of CPT®: ordered and reviewed    Patient Progress  Patient progress: improved        CONSULTS:  None    PROCEDURES:  Unless otherwise notedbelow, none     Ortho Injury    Date/Time: 4/14/2021 8:45 AM  Performed by: Jono Haynes MD  Authorized by: Jono Haynes MD   Consent: Verbal consent obtained.   Consent given by: patient  Patient identity confirmed: verbally with patient  Injury location: shoulder  Location details: right shoulder  Injury type: dislocation

## 2024-05-11 NOTE — TELEPHONE ENCOUNTER
----- Message from Caryle Lope, APRN sent at 6/7/2019  3:42 PM CDT -----  CRP elevated to suggest inflammation as discussed at [address] the steroids and you should see improvement Rinse mouth with warm salt water after eating.

## 2024-09-30 ENCOUNTER — OFFICE VISIT (OUTPATIENT)
Dept: INTERNAL MEDICINE | Facility: CLINIC | Age: 34
End: 2024-09-30
Payer: COMMERCIAL

## 2024-09-30 VITALS
DIASTOLIC BLOOD PRESSURE: 108 MMHG | HEIGHT: 68 IN | BODY MASS INDEX: 33.04 KG/M2 | SYSTOLIC BLOOD PRESSURE: 164 MMHG | TEMPERATURE: 98.2 F | HEART RATE: 73 BPM | OXYGEN SATURATION: 99 % | WEIGHT: 218 LBS

## 2024-09-30 DIAGNOSIS — I10 PRIMARY HYPERTENSION: ICD-10-CM

## 2024-09-30 DIAGNOSIS — H00.012 HORDEOLUM EXTERNUM OF RIGHT LOWER EYELID: Primary | ICD-10-CM

## 2024-09-30 DIAGNOSIS — Z00.00 ANNUAL PHYSICAL EXAM: ICD-10-CM

## 2024-09-30 DIAGNOSIS — Z11.59 ENCOUNTER FOR HEPATITIS C SCREENING TEST FOR LOW RISK PATIENT: ICD-10-CM

## 2024-09-30 PROBLEM — F41.9 ANXIETY: Status: ACTIVE | Noted: 2024-09-30

## 2024-09-30 PROCEDURE — 99385 PREV VISIT NEW AGE 18-39: CPT | Performed by: FAMILY MEDICINE

## 2024-09-30 RX ORDER — POLYMYXIN B SULFATE AND TRIMETHOPRIM 1; 10000 MG/ML; [USP'U]/ML
1 SOLUTION OPHTHALMIC EVERY 4 HOURS
Qty: 10 ML | Refills: 0 | Status: SHIPPED | OUTPATIENT
Start: 2024-09-30

## 2024-09-30 RX ORDER — HYDROCHLOROTHIAZIDE 12.5 MG/1
12.5 TABLET ORAL DAILY
Qty: 30 TABLET | Refills: 11 | Status: SHIPPED | OUTPATIENT
Start: 2024-09-30

## 2024-09-30 NOTE — PROGRESS NOTES
Subjective     Chief Complaint   Patient presents with    Stye     Right eye,        History of Present Illness    Patient's PMR from outside medical facility reviewed and noted.    Robe Galdamez is a 34 y.o. male who presents for an annual physical.  I discussed at preventative health care and cancer screening with him and its role in reducing types of cancer and other health problems appropriate for the patient's age.  Also discussed vaccines and current status with the patient.  Patient understands the risks and benefits of screening and is currently up-to-date with current recommendations that he chosses.    Patient would like to do his routine labs including a CBC comprehensive comprehensive metabolic panel and a lipid profile.    Patient comes in secondary to his stye present on his right lower eyelid he states that it started about 4 to 5 days ago had gotten better but it is since gotten worse.  We will go ahead and get him set up with something to help with this at this time.  Area did have a barillas on top of it went ahead and took a 22-gauge and lanced the stye patient had some very mild bleeding and remaining pus was removed we will start him on some eyedrops to help with this and patient is to use a wet compress to help with any further discomfort    Patient also noted to have significantly elevated hypertension today.  He knows he has had hypertension in the past and this has been a continual issue would like to get started on some medication we will go ahead and place him on some hydrochlorothiazide and see if this is helpful we will follow-up with the patient in approximately 1 week via nursing staff to check how his blood pressure is doing otherwise we will also do his annual exam today.    Past Medical History:   Past Medical History:   Diagnosis Date    Hyperlipidemia     Hypertension      Past Surgical History:  Past Surgical History:   Procedure Laterality Date    TONSILLECTOMY        Social History:  reports that he has quit smoking. His smoking use included cigarettes. He started smoking about 21 years ago. He has a 21.7 pack-year smoking history. He has never used smokeless tobacco. He reports current alcohol use. He reports that he does not currently use drugs.    Family History: family history includes Diabetes in his maternal grandmother; Hyperlipidemia in his father; Hypertension in his father; Lupus in his mother.      Allergies:  Allergies   Allergen Reactions    Codeine Hives    Penicillins Unknown - Low Severity     Medications:  Prior to Admission medications    Medication Sig Start Date End Date Taking? Authorizing Provider   ALPRAZolam (XANAX) 1 MG tablet Take 1 tablet by mouth 3 (Three) Times a Day As Needed for Anxiety.   Yes ProviderShelley MD   buprenorphine-naloxone (SUBOXONE) 8-2 MG film film Place  under the tongue 3 (Three) Times a Day.   Yes ProviderShelley MD       KELLI: Over the last two weeks, how often have you been bothered by the following problems?  Feeling nervous, anxious or on edge: Not at all  Not being able to stop or control worrying: Not at all  Worrying too much about different things: Not at all  Trouble Relaxing: Not at all  Being so restless that it is hard to sit still: Not at all  Becoming easily annoyed or irritable: Not at all  Feeling afraid as if something awful might happen: Not at all  KELLI 7 Total Score: 0  If you checked any problems, how difficult have these problems made it for you to do your work, take care of things at home, or get along with other people: Not difficult at all      PHQ-9 Depression Screening  Little interest or pleasure in doing things? 0-->not at all   Feeling down, depressed, or hopeless? 0-->not at all   Trouble falling or staying asleep, or sleeping too much?     Feeling tired or having little energy?     Poor appetite or overeating?     Feeling bad about yourself - or that you are a failure or have let  "yourself or your family down?     Trouble concentrating on things, such as reading the newspaper or watching television?     Moving or speaking so slowly that other people could have noticed? Or the opposite - being so fidgety or restless that you have been moving around a lot more than usual?     Thoughts that you would be better off dead, or of hurting yourself in some way?     PHQ-9 Total Score 0   If you checked off any problems, how difficult have these problems made it for you to do your work, take care of things at home, or get along with other people?         PHQ-9 Total Score: 0   0 (Negative screening for depression)  Support given, observe for worsening symptoms    Review of systems   negative unless otherwise specified above in HPI    Objective     Vital Signs: BP (!) 164/108 (BP Location: Left arm, Patient Position: Sitting, Cuff Size: Adult)   Pulse 73   Temp 98.2 °F (36.8 °C) (Infrared)   Ht 172.7 cm (68\")   Wt 98.9 kg (218 lb)   SpO2 99%   BMI 33.15 kg/m²     Physical Exam  Vitals and nursing note reviewed.   Constitutional:       General: He is not in acute distress.     Appearance: Normal appearance.   HENT:      Head: Normocephalic.   Eyes:      Extraocular Movements: Extraocular movements intact.      Pupils: Pupils are equal, round, and reactive to light.   Cardiovascular:      Rate and Rhythm: Normal rate and regular rhythm.      Heart sounds: Normal heart sounds. No murmur heard.  Pulmonary:      Effort: Pulmonary effort is normal. No respiratory distress.      Breath sounds: Normal breath sounds. No rhonchi or rales.   Abdominal:      General: Abdomen is flat. Bowel sounds are normal.      Palpations: Abdomen is soft.   Neurological:      General: No focal deficit present.      Mental Status: He is alert.         BMI is >= 30 and <35. (Class 1 Obesity). The following options were offered after discussion;: weight loss educational material (shared in after visit summary)      Results " Reviewed:  Glucose   Date Value Ref Range Status   06/07/2019 125 (H) 74 - 109 mg/dL Final     BUN   Date Value Ref Range Status   06/07/2019 10 6 - 20 mg/dL Final     Creatinine   Date Value Ref Range Status   06/07/2019 1 0.5 - 1.2 mg/dL Final     Sodium   Date Value Ref Range Status   06/07/2019 137 136 - 145 mmol/L Final     Potassium   Date Value Ref Range Status   06/07/2019 4.1 3.5 - 5.0 mmol/L Final     Chloride   Date Value Ref Range Status   06/07/2019 97 (L) 98 - 111 mmol/L Final     CO2   Date Value Ref Range Status   06/07/2019 24 22 - 29 mmol/L Final     Calcium   Date Value Ref Range Status   06/07/2019 9.1 8.6 - 10.0 mg/dL Final     ALT (SGPT)   Date Value Ref Range Status   06/07/2019 23 5 - 41 U/L Final     AST (SGOT)   Date Value Ref Range Status   06/07/2019 50 (H) 5 - 40 U/L Final     WBC   Date Value Ref Range Status   06/10/2019 11.5 (H) 4.8 - 10.8 K/uL Final     Hematocrit   Date Value Ref Range Status   06/10/2019 36.4 (L) 42.0 - 52.0 % Final     Platelets   Date Value Ref Range Status   06/10/2019 329 130 - 400 K/uL Final             Procedure   Procedures       Assessment / Plan     Assessment/Plan:   Diagnosis Plan   1. Hordeolum externum of right lower eyelid  trimethoprim-polymyxin b (Polytrim) 28459-1.1 UNIT/ML-% ophthalmic solution      2. Primary hypertension  hydroCHLOROthiazide 12.5 MG tablet      3. Encounter for hepatitis C screening test for low risk patient  Hepatitis C Antibody      4. Annual physical exam  CBC & Differential    Comprehensive Metabolic Panel    Lipid Panel            Return in about 1 year (around 9/30/2025) for Annual physical. unless patient needs to be seen sooner or acute issues arise.      I have discussed the patient results/orders and and plan/recommendation with them at today's visit.      Signed by:    Dixon Sung MD Date: 09/30/24

## 2024-10-02 ENCOUNTER — TELEPHONE (OUTPATIENT)
Dept: INTERNAL MEDICINE | Facility: CLINIC | Age: 34
End: 2024-10-02
Payer: COMMERCIAL

## 2024-10-02 DIAGNOSIS — E78.2 MIXED HYPERLIPIDEMIA: Primary | ICD-10-CM

## 2024-10-02 DIAGNOSIS — R74.8 ELEVATED LIVER ENZYMES: ICD-10-CM

## 2024-10-02 LAB
ALBUMIN SERPL-MCNC: 5.1 G/DL (ref 4.1–5.1)
ALP SERPL-CCNC: 104 IU/L (ref 44–121)
ALT SERPL-CCNC: 53 IU/L (ref 0–44)
AST SERPL-CCNC: 48 IU/L (ref 0–40)
BASOPHILS # BLD AUTO: 0.1 X10E3/UL (ref 0–0.2)
BASOPHILS NFR BLD AUTO: 1 %
BILIRUB SERPL-MCNC: 0.4 MG/DL (ref 0–1.2)
BUN SERPL-MCNC: 10 MG/DL (ref 6–20)
BUN/CREAT SERPL: 10 (ref 9–20)
CALCIUM SERPL-MCNC: 10.3 MG/DL (ref 8.7–10.2)
CHLORIDE SERPL-SCNC: 100 MMOL/L (ref 96–106)
CHOLEST SERPL-MCNC: 273 MG/DL (ref 100–199)
CO2 SERPL-SCNC: 23 MMOL/L (ref 20–29)
CREAT SERPL-MCNC: 1.04 MG/DL (ref 0.76–1.27)
EGFRCR SERPLBLD CKD-EPI 2021: 95 ML/MIN/1.73
EOSINOPHIL # BLD AUTO: 0.2 X10E3/UL (ref 0–0.4)
EOSINOPHIL NFR BLD AUTO: 2 %
ERYTHROCYTE [DISTWIDTH] IN BLOOD BY AUTOMATED COUNT: 12.5 % (ref 11.6–15.4)
GLOBULIN SER CALC-MCNC: 2.6 G/DL (ref 1.5–4.5)
GLUCOSE SERPL-MCNC: 90 MG/DL (ref 70–99)
HCT VFR BLD AUTO: 44.2 % (ref 37.5–51)
HCV IGG SERPL QL IA: NON REACTIVE
HDLC SERPL-MCNC: 49 MG/DL
HGB BLD-MCNC: 14.7 G/DL (ref 13–17.7)
IMM GRANULOCYTES # BLD AUTO: 0.1 X10E3/UL (ref 0–0.1)
IMM GRANULOCYTES NFR BLD AUTO: 1 %
LDLC SERPL CALC-MCNC: 164 MG/DL (ref 0–99)
LYMPHOCYTES # BLD AUTO: 1.7 X10E3/UL (ref 0.7–3.1)
LYMPHOCYTES NFR BLD AUTO: 18 %
MCH RBC QN AUTO: 31.6 PG (ref 26.6–33)
MCHC RBC AUTO-ENTMCNC: 33.3 G/DL (ref 31.5–35.7)
MCV RBC AUTO: 95 FL (ref 79–97)
MONOCYTES # BLD AUTO: 0.8 X10E3/UL (ref 0.1–0.9)
MONOCYTES NFR BLD AUTO: 8 %
NEUTROPHILS # BLD AUTO: 6.5 X10E3/UL (ref 1.4–7)
NEUTROPHILS NFR BLD AUTO: 70 %
PLATELET # BLD AUTO: 259 X10E3/UL (ref 150–450)
POTASSIUM SERPL-SCNC: 4.7 MMOL/L (ref 3.5–5.2)
PROT SERPL-MCNC: 7.7 G/DL (ref 6–8.5)
RBC # BLD AUTO: 4.65 X10E6/UL (ref 4.14–5.8)
SODIUM SERPL-SCNC: 141 MMOL/L (ref 134–144)
TRIGL SERPL-MCNC: 317 MG/DL (ref 0–149)
VLDLC SERPL CALC-MCNC: 60 MG/DL (ref 5–40)
WBC # BLD AUTO: 9.2 X10E3/UL (ref 3.4–10.8)

## 2024-10-02 RX ORDER — ROSUVASTATIN CALCIUM 10 MG/1
10 TABLET, COATED ORAL DAILY
Qty: 90 TABLET | Refills: 3 | Status: SHIPPED | OUTPATIENT
Start: 2024-10-02

## 2024-10-02 NOTE — TELEPHONE ENCOUNTER
Returned call to patient and discussed lab results and recommendations per your note. Patient agrees to start statin medication and will schedule ultrasound with [place], just need order placed.